# Patient Record
Sex: MALE | Race: WHITE | Employment: OTHER | URBAN - NONMETROPOLITAN AREA
[De-identification: names, ages, dates, MRNs, and addresses within clinical notes are randomized per-mention and may not be internally consistent; named-entity substitution may affect disease eponyms.]

---

## 2017-02-16 ENCOUNTER — OFFICE VISIT (OUTPATIENT)
Dept: FAMILY MEDICINE CLINIC | Age: 42
End: 2017-02-16

## 2017-02-16 VITALS
DIASTOLIC BLOOD PRESSURE: 70 MMHG | BODY MASS INDEX: 36.68 KG/M2 | HEIGHT: 71 IN | WEIGHT: 262 LBS | HEART RATE: 72 BPM | SYSTOLIC BLOOD PRESSURE: 118 MMHG

## 2017-02-16 DIAGNOSIS — F41.9 ANXIETY: Primary | ICD-10-CM

## 2017-02-16 DIAGNOSIS — E78.1 HYPERTRIGLYCERIDEMIA: ICD-10-CM

## 2017-02-16 DIAGNOSIS — R73.01 IMPAIRED FASTING BLOOD SUGAR: ICD-10-CM

## 2017-02-16 PROCEDURE — 99214 OFFICE O/P EST MOD 30 MIN: CPT | Performed by: FAMILY MEDICINE

## 2017-02-16 RX ORDER — ATORVASTATIN CALCIUM 20 MG/1
20 TABLET, FILM COATED ORAL DAILY
Qty: 30 TABLET | Refills: 11 | Status: SHIPPED | OUTPATIENT
Start: 2017-02-16 | End: 2018-03-19 | Stop reason: SDUPTHER

## 2017-02-16 ASSESSMENT — ENCOUNTER SYMPTOMS
RESPIRATORY NEGATIVE: 1
ALLERGIC/IMMUNOLOGIC NEGATIVE: 1
EYES NEGATIVE: 1
GASTROINTESTINAL NEGATIVE: 1

## 2017-08-18 RX ORDER — CITALOPRAM 20 MG/1
20 TABLET ORAL DAILY
Qty: 90 TABLET | Refills: 1 | Status: SHIPPED | OUTPATIENT
Start: 2017-08-18 | End: 2018-02-19 | Stop reason: SDUPTHER

## 2017-08-18 RX ORDER — GEMFIBROZIL 600 MG/1
600 TABLET, FILM COATED ORAL 2 TIMES DAILY
Qty: 180 TABLET | Refills: 1 | Status: SHIPPED | OUTPATIENT
Start: 2017-08-18 | End: 2018-03-19 | Stop reason: SDUPTHER

## 2018-02-19 RX ORDER — CITALOPRAM 20 MG/1
TABLET ORAL
Qty: 90 TABLET | Refills: 1 | Status: SHIPPED | OUTPATIENT
Start: 2018-02-19

## 2018-03-19 RX ORDER — GEMFIBROZIL 600 MG/1
TABLET, FILM COATED ORAL
Qty: 180 TABLET | Refills: 3 | Status: SHIPPED | OUTPATIENT
Start: 2018-03-19

## 2018-03-19 RX ORDER — ATORVASTATIN CALCIUM 20 MG/1
TABLET, FILM COATED ORAL
Qty: 30 TABLET | Refills: 11 | Status: SHIPPED | OUTPATIENT
Start: 2018-03-19

## 2019-01-08 ENCOUNTER — HOSPITAL ENCOUNTER (OUTPATIENT)
Dept: GENERAL RADIOLOGY | Age: 44
Discharge: HOME | End: 2019-01-10
Payer: COMMERCIAL

## 2019-01-08 DIAGNOSIS — M25.511 RIGHT SHOULDER PAIN, UNSPECIFIED CHRONICITY: ICD-10-CM

## 2019-01-08 PROCEDURE — 73030 X-RAY EXAM OF SHOULDER: CPT

## 2021-05-25 ENCOUNTER — HOSPITAL ENCOUNTER (OUTPATIENT)
Dept: GENERAL RADIOLOGY | Age: 46
Discharge: HOME | End: 2021-05-25
Payer: COMMERCIAL

## 2021-05-25 ENCOUNTER — HOSPITAL ENCOUNTER (OUTPATIENT)
Age: 46
Discharge: HOME | End: 2021-05-25
Payer: COMMERCIAL

## 2021-05-25 DIAGNOSIS — M46.1 BILATERAL SACROILIITIS (HCC): ICD-10-CM

## 2021-05-25 PROCEDURE — 72100 X-RAY EXAM L-S SPINE 2/3 VWS: CPT

## 2021-06-10 ENCOUNTER — HOSPITAL ENCOUNTER (OUTPATIENT)
Dept: PHYSICAL THERAPY | Age: 46
Setting detail: THERAPIES SERIES
Discharge: HOME | End: 2021-06-10
Payer: COMMERCIAL

## 2021-06-10 PROCEDURE — 97161 PT EVAL LOW COMPLEX 20 MIN: CPT | Performed by: PHYSICAL THERAPIST

## 2021-06-10 PROCEDURE — 97110 THERAPEUTIC EXERCISES: CPT | Performed by: PHYSICAL THERAPIST

## 2021-06-10 ASSESSMENT — PAIN DESCRIPTION - PROGRESSION: CLINICAL_PROGRESSION: GRADUALLY WORSENING

## 2021-06-10 ASSESSMENT — PAIN DESCRIPTION - PAIN TYPE: TYPE: ACUTE PAIN

## 2021-06-10 ASSESSMENT — PAIN DESCRIPTION - ORIENTATION: ORIENTATION: RIGHT

## 2021-06-10 ASSESSMENT — PAIN SCALES - GENERAL: PAINLEVEL_OUTOF10: 10

## 2021-06-10 ASSESSMENT — PAIN DESCRIPTION - FREQUENCY: FREQUENCY: CONTINUOUS

## 2021-06-10 ASSESSMENT — PAIN DESCRIPTION - ONSET: ONSET: PROGRESSIVE

## 2021-06-10 ASSESSMENT — PAIN - FUNCTIONAL ASSESSMENT: PAIN_FUNCTIONAL_ASSESSMENT: PREVENTS OR INTERFERES WITH MANY ACTIVE NOT PASSIVE ACTIVITIES

## 2021-06-10 ASSESSMENT — PAIN DESCRIPTION - LOCATION: LOCATION: BACK;BUTTOCKS;HIP;LEG

## 2021-06-10 NOTE — PROGRESS NOTES
Physical Therapy  Initial Assessment  Date: 6/10/2021  Patient Name: Ines Carter  MRN: [id number]  : 1975     Treatment Diagnosis: LBP and R sciatica    Restrictions- none       Subjective   General  Chart Reviewed: Yes  Patient assessed for rehabilitation services?: Yes  Response To Previous Treatment: Not applicable  Family / Caregiver Present: No  Referring Practitioner: [place] DAVILA  Referral Date : 21  Diagnosis: M54.41 lumbago and sciatica  Follows Commands: Within Functional Limits  PT Visit Information  Onset Date: 21  PT Insurance Information: [employer]  Subjective  Subjective: LBP and R sciatica started 6 weeks ago. progressive onset. Has had it before, but this is worse and won't go away. Pain Screening  Patient Currently in Pain: Yes  Pain Assessment  Pain Assessment: 0-10  Pain Level: 10  Patient's Stated Pain Goal: 2  Pain Type: Acute pain  Pain Location: Back;Buttocks;Hip;Leg  Pain Orientation: Right  Pain Radiating Towards: From back to R foot. Pain Descriptors: Aching; Shooting;Radiating; Heaviness;Pins and needles  Pain Frequency: Continuous  Pain Onset: Progressive  Clinical Progression: Gradually worsening  Functional Pain Assessment: Prevents or interferes with many active not passive activities  Vital Signs  Patient Currently in Pain: Yes    Vision/Hearing  Vision  Vision: Within Functional Limits  Hearing  Hearing: Within functional limits    Orientation  Orientation  Overall Orientation Status: Within Normal Limits    Social/Functional History  Social/Functional History  Type of Home: House  Home Layout: Two level  ADL Assistance: Independent  Homemaking Assistance: Independent  Ambulation Assistance: Independent  Transfer Assistance: Independent  Active : Yes  Mode of Transportation: Car  Occupation: Full time employment  Type of occupation: Law enforcement training.   IADL Comments: Pain with sitting, bending    Objective     Observation/Palpation  Posture: Good  Observation: No lateral shift  Body Mechanics: Limps favoring R LE    Spine  Lumbar: Flexion major loss to mid thigh. Extension mod loss of 20%. R side glide mod loss, L side glide major loss. Special Tests: FIS, RFIS increase, worse, periph. EIS, AZAM decrease, better. BULMARO, RFIL increase, worse, periph. EIL, REIL decrease, better, centralized. R SGIL decrease, better, centralized. Joint Mobility  Spine: lumbar post derangement, unilat, assymmetrical past the knee    Strength RLE  Strength RLE: WNL  Comment: no myotome pattern weakness     Additional Measures  Special Tests: R Slump + pain to the foot.   Other: R SLR + pain to the calf (+ pain cough/sneeze)        Assessment   Conditions Requiring Skilled Therapeutic Intervention  Body structures, Functions, Activity limitations: Increased pain;Decreased ROM  Treatment Diagnosis: LBP and R sciatica  Prognosis: Good  Decision Making: Low Complexity  REQUIRES PT FOLLOW UP: Yes  Activity Tolerance  Activity Tolerance: Patient Tolerated treatment well         Plan   Plan  Times per week: 2  Plan weeks: 4  Current Treatment Recommendations: Strengthening, ROM, Home Exercise Program, Manual Therapy - Joint Manipulation, Modalities    OutComes Score   Oswestry LBP Scale 30/50 = 60% disability                         Goals  Short term goals  Time Frame for Short term goals: 1 week  Short term goal 1: Start HEP  Long term goals  Time Frame for Long term goals : 4-6 weeks  Long term goal 1: LBP and sciatica controlled 2/10  Long term goal 2: R sciatica resolve 90%  Long term goal 3: Able to walk 45 min with no limp  Long term goal 4: Able to tolerate sitting & driving 60 min       Therapy Time   Individual Concurrent Group Co-treatment   Time In 0800         Time Out 0844         Minutes [address], PT

## 2021-06-10 NOTE — FLOWSHEET NOTE
Physical Therapy Daily Treatment Note    Date:  6/10/2021    Patient Name:  Malinda Shelton    :  1975  MRN: [id number]  Restrictions/Precautions:     Medical/Treatment Diagnosis Information:   · Diagnosis: M54.41 lumbago and sciatica  · Treatment Diagnosis: LBP and R sciatica  Insurance/Certification information:  PT Insurance Information: [employer]  Physician Information:  Referring Practitioner: Ilana DAVILA  Plan of care signed (Y/N):  n  Visit# / total visits: 1 /10  Pain level: 10       Time In:8:00   Time Out:8:44    Progress Note: [x]  Yes  []  No  Next due by: Visit #10 , or 21     Subjective:   See eval    Objective: See eval  Observation:   Test measurements:      Exercises: there ex for lumbar ROM, reduction of post derangement  Exercise/Equipment Resistance/Repetitions Other comments   Lay prone, R SG 3' x2    Prone on elbows 3' x2    Press up 10x x2    B PKF 10x          Modified extension 10x     Back bend 10x         Lumbar roll 2'          TM retro          Sciatic N floss          [x] Provided verbal/tactile cueing for activities related to strengthening, flexibility, endurance, ROM. (67389)  [] Provided verbal/tactile cueing for activities related to improving balance, coordination, kinesthetic sense, posture, motor skill, proprioception. (38444)    Therapeutic Activities:     [] Therapeutic activities, direct (one-on-one) patient contact (use of dynamic activities to improve functional performance). (71588)    Gait:   [] Provided training and instruction to the patient for ambulation re-education. (80540)    Self-Care/ADL's  [] Self-care/home management training and compensatory training, meal preparation, safety procedures, and instructions in use of assistive technology devices/adaptive equipment, direct one-on-one contact.  (53547)    Home Exercise Program: Lumbar extension progression for post derangement    [x] Reviewed/Progressed HEP activities related to strengthening, flexibility, endurance, ROM. (85285)  [] Reviewed/Progressed HEP activities related to improving balance, coordination, kinesthetic sense, posture, motor skill, proprioception.  (21644)    Manual Treatments:    [] Provided manual therapy to mobilize soft tissue/joints for the purpose of modulating pain, promoting relaxation,  increasing ROM, reducing/eliminating soft tissue swelling/inflammation/restriction, improving soft tissue extensibility. (12508)    Service Based Modalities:      Timed Code Treatment Minutes:   There ex/HEP 15'     Total Treatment Minutes:  15'     Treatment/Activity Tolerance:  [x] Patient tolerated treatment well [] Patient limited by fatique  [] Patient limited by pain  [] Patient limited by other medical complications  [] Other:     Prognosis: [x] Good [] Fair  [] Poor    Patient Requires Follow-up: [x] Yes  [] No      Goals:  Short term goals  Time Frame for Short term goals: 1 week  Short term goal 1: Start HEP    Long term goals  Time Frame for Long term goals : 4-6 weeks  Long term goal 1: LBP and sciatica controlled 2/10  Long term goal 2: R sciatica resolve 90%  Long term goal 3: Able to walk 45 min with no limp  Long term goal 4: Able to tolerate sitting & driving 60 min          Plan:   [] Continue per plan of care [] Alter current plan (see comments)  [x] Plan of care initiated [] Hold pending MD visit [] Discharge  Plan for Next Session:      Electronically signed by:  Jason March PT,PT

## 2021-06-10 NOTE — PLAN OF CARE
[facility]    [x] [place]  Phone: [phone]  Fax: [phone]      [] [place]  Phone: [phone]  Fax: [phone]        To: Referring Practitioner: Felicity DAVILA      Patient: Michael Fuentes  : 1975   MRN: [id number]  Evaluation Date: 6/10/2021      Diagnosis Information:  · Diagnosis: M54.41 lumbago and sciatica   · Treatment Diagnosis: LBP and R sciatica     Physical Therapy Certification Form  Dear Felicity DAVILA  The following patient has been evaluated for physical therapy services and for therapy to continue, Medicare requires monthly physician review of the treatment plan. Please review the attached evaluation and/or summary of the patient's plan of care, and verify that you agree therapy should continue by signing the attached document and sending it back to our office.     Plan of Care/Treatment to date:  [x] Therapeutic Exercise    [] Modalities:  [] Therapeutic Activity     [] Ultrasound  [x] Electrical Stimulation  [] Gait Training      [] Cervical Traction [] Lumbar Traction  [] Neuromuscular Re-education    [] Cold/hotpack [] Iontophoresis   [x] Instruction in HEP     Other:  [x] Manual Therapy      []             [] Aquatic Therapy      []                 Goals:  Short term goals  Time Frame for Short term goals: 1 week  Short term goal 1: Start HEP    Long term goals  Time Frame for Long term goals : 4-6 weeks  Long term goal 1: LBP and sciatica controlled 2/10  Long term goal 2: R sciatica resolve 90%  Long term goal 3: Able to walk 45 min with no limp  Long term goal 4: Able to tolerate sitting & driving 60 min    DAYO/ADRIANAKEIQLV: - 21  # Days per week: [] 1 day # Weeks: [] 1 week [] 5 weeks     [x] 2 days   [] 2 weeks [] 6 weeks     [] 3 days   [] 3 weeks [] 7 weeks     [] 4 days   [x] 4 weeks [] 8 weeks    Rehab Potential: [] Excellent [x] Good [] Fair  [] Poor     Electronically signed by:  Kassandra Dillon PT      If you have any questions or concerns, please don't hesitate to call.   Thank you for your referral.      Physician Signature:________________________________Date:__________________  By signing above, therapists plan is approved by physician

## 2021-06-14 ENCOUNTER — HOSPITAL ENCOUNTER (OUTPATIENT)
Dept: PHYSICAL THERAPY | Age: 46
Setting detail: THERAPIES SERIES
Discharge: HOME | End: 2021-06-14
Payer: COMMERCIAL

## 2021-06-14 PROCEDURE — G0283 ELEC STIM OTHER THAN WOUND: HCPCS | Performed by: PHYSICAL THERAPY ASSISTANT

## 2021-06-14 PROCEDURE — 97110 THERAPEUTIC EXERCISES: CPT | Performed by: PHYSICAL THERAPY ASSISTANT

## 2021-06-14 NOTE — PROGRESS NOTES
I have reviewed and agree to the content of the note written by the PTA.   Electronically signed by Karolina Boogie PT [id number]

## 2021-06-14 NOTE — FLOWSHEET NOTE
Physical Therapy Daily Treatment Note    Date:  2021    Patient Name:  Adelaide Russ    :  1975  MRN: [id number]  Restrictions/Precautions:     Medical/Treatment Diagnosis Information:   · Diagnosis: M54.41 lumbago and sciatica  · Treatment Diagnosis: LBP and R sciatica  Insurance/Certification information:  PT Insurance Information: [employer]  Physician Information:  Referring Practitioner: Theo DAVILA  Plan of care signed (Y/N):  n  Visit# / total visits:  2/10  Pain level: 8-9/10       Time In:0602  Time Out:  702    Progress Note: []  Yes  [x]  No  Next due by: Visit #10 , or 21     Subjective:  Pt. Relates feeling poor this morning, pain rated 8-9/10. Pt. Relates receiving a shot or Toradol over weekend to help with pain, also placed on steroid pack. Objective:  SILVINA complete per flow chart to facilitate strength, motion and stability to allow ease with daily activities and work duties. Initiated and advanced several exercises to improve motion and decrease pain and discomfort. Discussed use of lumbar roll and encouraged avoidance of flexion based exercises and activities. Initiated IFC to decrease pain and discomfort. Observation: Able to reduce pain from 9/10 to 6/10. Able to decrease radicular symptoms.      Test measurements:      Exercises:   Exercise/Equipment Resistance/Repetitions Other comments   Lay prone, R SG 5'    Prone on elbows 5'    Press up 10x x2    B PKF 10x 2    Prone hip ext 10x  Initiated                  Modified extension 10x     Back bend 10x    Hip abd/ext 10x Initiated   Lumbar roll           TM retro 3' Initiated        Sciatic N floss 2'         [x] Provided verbal/tactile cueing for activities related to strengthening, flexibility, endurance, ROM. (94357)  [] Provided verbal/tactile cueing for activities related to improving balance, coordination, kinesthetic sense, posture, motor skill, proprioception. (98701)    Therapeutic Activities:     [] Therapeutic activities, direct (one-on-one) patient contact (use of dynamic activities to improve functional performance). (29258)    Gait:   [] Provided training and instruction to the patient for ambulation re-education. (88315)    Self-Care/ADL's  [] Self-care/home management training and compensatory training, meal preparation, safety procedures, and instructions in use of assistive technology devices/adaptive equipment, direct one-on-one contact. (68083)    Home Exercise Program: Lumbar extension progression for post derangement    [x] Reviewed/Progressed HEP activities related to strengthening, flexibility, endurance, ROM. (01969)  [] Reviewed/Progressed HEP activities related to improving balance, coordination, kinesthetic sense, posture, motor skill, proprioception.  (34378)    Manual Treatments:    [] Provided manual therapy to mobilize soft tissue/joints for the purpose of modulating pain, promoting relaxation,  increasing ROM, reducing/eliminating soft tissue swelling/inflammation/restriction, improving soft tissue extensibility.  (47977)    Service Based Modalities:  15' IFC    Timed Code Treatment Minutes:  39' SILVINA     Total Treatment Minutes:  61'    Treatment/Activity Tolerance:  [x] Patient tolerated treatment well [] Patient limited by fatique  [] Patient limited by pain  [] Patient limited by other medical complications  [] Other:     Prognosis: [x] Good [] Fair  [] Poor    Patient Requires Follow-up: [x] Yes  [] No      Goals:  Short term goals  Time Frame for Short term goals: 1 week  Short term goal 1: Start HEP (initiated 6/10/21)    Long term goals  Time Frame for Long term goals : 4-6 weeks  Long term goal 1: LBP and sciatica controlled 2/10  Long term goal 2: R sciatica resolve 90%  Long term goal 3: Able to walk 45 min with no limp  Long term goal 4: Able to tolerate sitting & driving 60 min      Plan:   [x] Continue per plan of care [] Alter current plan (see comments)  [] Plan of care initiated [] Hold pending MD visit [] Discharge    Plan for Next Session:  Monitor tolerance to exercises and advance as able. Electronically signed by:   Ronen Killian PTA,

## 2021-06-15 ENCOUNTER — HOSPITAL ENCOUNTER (EMERGENCY)
Age: 46
Discharge: HOME | End: 2021-06-15
Attending: EMERGENCY MEDICINE
Payer: COMMERCIAL

## 2021-06-15 VITALS
HEART RATE: 76 BPM | WEIGHT: 260 LBS | OXYGEN SATURATION: 98 % | TEMPERATURE: 96.7 F | SYSTOLIC BLOOD PRESSURE: 137 MMHG | HEIGHT: 70 IN | BODY MASS INDEX: 37.22 KG/M2 | RESPIRATION RATE: 16 BRPM | DIASTOLIC BLOOD PRESSURE: 98 MMHG

## 2021-06-15 DIAGNOSIS — M54.31 SCIATICA OF RIGHT SIDE: Primary | ICD-10-CM

## 2021-06-15 PROCEDURE — 6360000002 HC RX W HCPCS: Performed by: EMERGENCY MEDICINE

## 2021-06-15 PROCEDURE — 99285 EMERGENCY DEPT VISIT HI MDM: CPT

## 2021-06-15 PROCEDURE — 96372 THER/PROPH/DIAG INJ SC/IM: CPT

## 2021-06-15 RX ORDER — GABAPENTIN 300 MG/1
CAPSULE ORAL
COMMUNITY
Start: 2021-06-11

## 2021-06-15 RX ORDER — KETOROLAC TROMETHAMINE 30 MG/ML
30 INJECTION, SOLUTION INTRAMUSCULAR; INTRAVENOUS ONCE
Status: COMPLETED | OUTPATIENT
Start: 2021-06-15 | End: 2021-06-15

## 2021-06-15 RX ADMIN — KETOROLAC TROMETHAMINE 30 MG: 30 INJECTION, SOLUTION INTRAMUSCULAR; INTRAVENOUS at 20:06

## 2021-06-15 RX ADMIN — HYDROMORPHONE HYDROCHLORIDE 1 MG: 1 INJECTION, SOLUTION INTRAMUSCULAR; INTRAVENOUS; SUBCUTANEOUS at 20:33

## 2021-06-15 ASSESSMENT — PAIN SCALES - GENERAL
PAINLEVEL_OUTOF10: 10
PAINLEVEL_OUTOF10: 10

## 2021-06-15 ASSESSMENT — PAIN DESCRIPTION - PAIN TYPE
TYPE: CHRONIC PAIN
TYPE: CHRONIC PAIN

## 2021-06-15 ASSESSMENT — PAIN DESCRIPTION - LOCATION
LOCATION: BACK
LOCATION: BACK

## 2021-06-15 ASSESSMENT — PAIN DESCRIPTION - ORIENTATION
ORIENTATION: LOWER
ORIENTATION: LOWER

## 2021-06-15 ASSESSMENT — PAIN DESCRIPTION - FREQUENCY: FREQUENCY: CONTINUOUS

## 2021-06-15 ASSESSMENT — PAIN DESCRIPTION - DESCRIPTORS: DESCRIPTORS: SPASM

## 2021-06-16 ENCOUNTER — HOSPITAL ENCOUNTER (OUTPATIENT)
Dept: MRI IMAGING | Age: 46
Discharge: HOME | End: 2021-06-18
Payer: COMMERCIAL

## 2021-06-16 ENCOUNTER — HOSPITAL ENCOUNTER (OUTPATIENT)
Dept: PHYSICAL THERAPY | Age: 46
Setting detail: THERAPIES SERIES
Discharge: HOME | End: 2021-06-16
Payer: COMMERCIAL

## 2021-06-16 DIAGNOSIS — M54.41 LOW BACK PAIN WITH RIGHT-SIDED SCIATICA, UNSPECIFIED BACK PAIN LATERALITY, UNSPECIFIED CHRONICITY: ICD-10-CM

## 2021-06-16 PROCEDURE — 72148 MRI LUMBAR SPINE W/O DYE: CPT

## 2021-06-16 NOTE — PROGRESS NOTES
Outpatient Physical Therapy    [] [place]  Phone: [phone]  Fax: [phone]      [] [place]  Phone: [phone]  Fax: [phone]    Physical Therapy  Cancellation/No-show Note  Patient Name:  Chase Perrin  :  1975   Date:  2021  Cancelled visits to date: 1  No-shows to date: 0    For today's appointment patient:  [x]  Cancelled  []  Rescheduled appointment  []  No-show     Reason given by patient:  []  Patient ill  []  Conflicting appointment  []  No transportation    []  Conflict with work  []  No reason given  [x]  Other:     Comments: Was in ER previous night      Electronically signed by:  Ronen Killian PTA

## 2021-06-16 NOTE — ED PROVIDER NOTES
eMERGENCY dEPARTMENT eNCOUnter      Pt Name: Maico Diaz  MRN: [id number]  [place] 1975  Date of evaluation: 6/15/2021      CHIEF COMPLAINT       Chief Complaint   Patient presents with    Back Pain     lower         HISTORY OF PRESENT ILLNESS    Maico Diaz is a 51 y.o. male who presents with back pain. Patient states he has been having this back pain for some time now he has been through physical therapy and is scheduled to see his primary again they have tried steroids which he just finished yesterday states he got worse today when he was lifting up a tire he denies any bowel or bladder dysfunction. REVIEW OF SYSTEMS       Review of systems are all reviewed and negative except stated above in HPI    [address]    has a past medical history of Anxiety, Appendicitis, Hypertriglyceridemia, and Simple myopia. SURGICAL HISTORY      has a past surgical history that includes laparoscopic appendectomy (2011); Vasectomy (Bilateral, 2007); Tonsillectomy (); and Appendectomy. CURRENT MEDICATIONS       Discharge Medication List as of 6/15/2021  8:52 PM      CONTINUE these medications which have NOT CHANGED    Details   gabapentin (NEURONTIN) 300 MG capsule Historical Med      gemfibrozil (LOPID) 600 MG tablet take 1 tablet by mouth twice a day, Disp-180 tablet, R-3Normal      atorvastatin (LIPITOR) 20 MG tablet take 1 tablet by mouth once daily, Disp-30 tablet, R-11Normal      citalopram (CELEXA) 20 MG tablet take 1 tablet by mouth once daily, Disp-90 tablet, R-1Normal             ALLERGIES     is allergic to aspirin and penicillins. FAMILY HISTORY     He indicated that his mother is alive. He indicated that his father is alive. He indicated that his brother is alive. He indicated that his maternal grandmother is . He indicated that his maternal grandfather is . He indicated that his paternal grandmother is .  He indicated that his paternal grandfather is . He indicated that his daughter is alive. He indicated that his son is alive. family history includes Hypertension in his father and mother. SOCIAL HISTORY      reports that he has quit smoking. His smoking use included cigarettes. He has a 5.00 pack-year smoking history. He has never used smokeless tobacco. He reports current alcohol use. He reports that he does not use drugs. PHYSICAL EXAM     INITIAL VITALS:  height is 5' 10\" (1.778 m) and weight is 260 lb (117.9 kg). His tympanic temperature is 96.7 °F (35.9 °C). His blood pressure is 137/98 (abnormal) and his pulse is 76. His respiration is 16 and oxygen saturation is 98%.       General: Patient alert nontoxic-appearing male who appears uncomfortable secondary to pain  HEENT: Head is atraumatic  Neck: Supple  Respiratory: Lung sounds are clear bilateral  Cardiac: Heart is regular rate and rhythm  GI: Abdomen soft nontender bowel sounds active throughout  Back: No CVA tenderness she has reproducible pain over the right SI joint    DIFFERENTIAL DIAGNOSIS/ MDM:     Sciatica herniated disc    DIAGNOSTIC RESULTS     EKG: All EKG's are interpreted by the Emergency Department Physician who either signs or Co-signs this chart in the absence of a cardiologist.        RADIOLOGY:   I directly visualized the following  images and reviewed the radiologist interpretations:  No orders to display         LABS:  Labs Reviewed - No data to display      EMERGENCY DEPARTMENT COURSE:   Vitals:    Vitals:    06/15/21 1950 06/15/21 1954 06/15/21 2056   BP: (!) 148/91  (!) 137/98   Pulse: 88  76   Resp: 16  16   Temp:  96.7 °F (35.9 °C)    TempSrc:  Tympanic    SpO2: 97%  98%   Weight: 260 lb (117.9 kg)     Height: 5' 10\" (1.778 m)       -------------------------  BP: (!) 137/98, Temp: 96.7 °F (35.9 °C), Pulse: 76, Resp: 16    Orders Placed This Encounter   Medications    ketorolac (TORADOL) injection 30 mg    HYDROmorphone (DILAUDID) injection 1 mg Re-evaluation Notes    Patient has no focal neurological deficits he has been evaluated and diagnosed with sciatica in the past I attempted to give him pain chest none of which is functioning at this time I do feel he needs follow-up with MRI return if worse    CRITICAL CARE:   None      CONSULTS:      PROCEDURES:  None    FINAL IMPRESSION      1. Sciatica of right side          DISPOSITION/PLAN   DISPOSITION Decision To Discharge 06/15/2021 08:51:54 PM      Condition on Disposition    Stable    PATIENT REFERRED TO:  GIOVANNI Marie  [phone]    In 1 day        DISCHARGE MEDICATIONS:  Discharge Medication List as of 6/15/2021  8:52 PM          (Please note that portions of this note were completed with a voice recognition program.  Efforts were made to edit the dictations but occasionally words are mis-transcribed.)    Saray Cohen MD,, MD, F.A.C.E.P.   Attending Emergency Physician        Saray Cohen MD  06/16/21 8140

## 2021-07-02 ENCOUNTER — APPOINTMENT (OUTPATIENT)
Dept: INTERVENTIONAL RADIOLOGY/VASCULAR | Age: 46
End: 2021-07-02
Payer: COMMERCIAL

## 2021-07-02 ENCOUNTER — HOSPITAL ENCOUNTER (EMERGENCY)
Age: 46
Discharge: HOME | End: 2021-07-02
Attending: EMERGENCY MEDICINE
Payer: COMMERCIAL

## 2021-07-02 VITALS
RESPIRATION RATE: 16 BRPM | TEMPERATURE: 96.7 F | BODY MASS INDEX: 36.4 KG/M2 | DIASTOLIC BLOOD PRESSURE: 77 MMHG | HEART RATE: 90 BPM | HEIGHT: 71 IN | SYSTOLIC BLOOD PRESSURE: 138 MMHG | WEIGHT: 260 LBS | OXYGEN SATURATION: 98 %

## 2021-07-02 DIAGNOSIS — G89.18 POSTOPERATIVE PAIN AFTER SPINAL SURGERY: ICD-10-CM

## 2021-07-02 DIAGNOSIS — M54.31 SCIATICA OF RIGHT SIDE: Primary | ICD-10-CM

## 2021-07-02 PROCEDURE — 96372 THER/PROPH/DIAG INJ SC/IM: CPT

## 2021-07-02 PROCEDURE — 99284 EMERGENCY DEPT VISIT MOD MDM: CPT

## 2021-07-02 PROCEDURE — 6360000002 HC RX W HCPCS: Performed by: EMERGENCY MEDICINE

## 2021-07-02 PROCEDURE — 93971 EXTREMITY STUDY: CPT

## 2021-07-02 RX ORDER — OXYCODONE AND ACETAMINOPHEN 10; 325 MG/1; MG/1
1 TABLET ORAL EVERY 4 HOURS PRN
COMMUNITY

## 2021-07-02 RX ORDER — FAMOTIDINE 10 MG
10 TABLET ORAL 2 TIMES DAILY
COMMUNITY

## 2021-07-02 RX ORDER — POLYETHYLENE GLYCOL 3350 17 G/17G
17 POWDER, FOR SOLUTION ORAL DAILY
COMMUNITY

## 2021-07-02 RX ORDER — KETOROLAC TROMETHAMINE 30 MG/ML
60 INJECTION, SOLUTION INTRAMUSCULAR; INTRAVENOUS ONCE
Status: COMPLETED | OUTPATIENT
Start: 2021-07-02 | End: 2021-07-02

## 2021-07-02 RX ORDER — DEXAMETHASONE SODIUM PHOSPHATE 10 MG/ML
10 INJECTION INTRAMUSCULAR; INTRAVENOUS ONCE
Status: COMPLETED | OUTPATIENT
Start: 2021-07-02 | End: 2021-07-02

## 2021-07-02 RX ORDER — AMLODIPINE BESYLATE 5 MG/1
5 TABLET ORAL DAILY
COMMUNITY

## 2021-07-02 RX ADMIN — DEXAMETHASONE SODIUM PHOSPHATE 10 MG: 10 INJECTION INTRAMUSCULAR; INTRAVENOUS at 11:39

## 2021-07-02 RX ADMIN — KETOROLAC TROMETHAMINE 60 MG: 30 INJECTION, SOLUTION INTRAMUSCULAR at 11:39

## 2021-07-02 ASSESSMENT — ENCOUNTER SYMPTOMS
BLOOD IN STOOL: 0
CONSTIPATION: 1
ABDOMINAL PAIN: 0
DIARRHEA: 0
BACK PAIN: 1
SORE THROAT: 0
WHEEZING: 0
NAUSEA: 0
VOMITING: 0
SHORTNESS OF BREATH: 0
TROUBLE SWALLOWING: 0

## 2021-07-02 ASSESSMENT — PAIN SCALES - GENERAL
PAINLEVEL_OUTOF10: 9
PAINLEVEL_OUTOF10: 10

## 2021-07-02 ASSESSMENT — PAIN DESCRIPTION - DIRECTION: RADIATING_TOWARDS: LEG

## 2021-07-02 ASSESSMENT — PAIN DESCRIPTION - LOCATION: LOCATION: BACK

## 2021-07-02 ASSESSMENT — PAIN DESCRIPTION - ORIENTATION: ORIENTATION: RIGHT

## 2021-07-02 ASSESSMENT — PAIN DESCRIPTION - PAIN TYPE: TYPE: ACUTE PAIN

## 2021-07-02 NOTE — ED PROVIDER NOTES
[address] [facility]  eMERGENCY dEPARTMENT eNCOUnter      Pt Name: Lisandra Le  MRN: [id number]  [place] 1975  Date of evaluation: 7/2/2021      CHIEF COMPLAINT       Chief Complaint   Patient presents with    Leg Pain     increasing right calf pain, pt relates to his sciatica pain, recent surgery         HISTORY OF PRESENT ILLNESS    Lisandra Le is a 51 y.o. male who presents with complaints of increasing right leg pain patient had a lumbar surgery last week and complications so he had a fusion done on Monday at [facility] he is currently on 10 mg Percocets and gabapentin the pain is getting worse it radiates to the same distribution as his previous sciatica through the right buttock down to the right lateral calf he contacted his surgeon today recommend he come in and make sure he is does not have a DVT. That he may be getting a course of steroids there is been no fevers no chills this is the same pain he had before no new weakness but any kind of movement causes severe pain patient says the incision seems to be doing well  Is been no bowel or bladder incontinence, patient states that he is taking Colace and MiraLAX    REVIEW OF SYSTEMS         Review of Systems   Constitutional: Negative for chills and fever. HENT: Negative for congestion, dental problem, sore throat and trouble swallowing. Eyes: Negative for visual disturbance. Respiratory: Negative for shortness of breath and wheezing. Cardiovascular: Negative for chest pain, palpitations and leg swelling. Gastrointestinal: Positive for constipation. Negative for abdominal pain, blood in stool, diarrhea, nausea and vomiting. Genitourinary: Negative for difficulty urinating, dysuria and testicular pain. Musculoskeletal: Positive for back pain. Negative for joint swelling and neck pain. Back pain without radiation to the right leg as described   Skin: Negative for rash.    Neurological: Negative for dizziness, syncope, weakness, numbness and headaches. Hematological: Negative for adenopathy. Does not bruise/bleed easily. Psychiatric/Behavioral: Negative for confusion and suicidal ideas. PAST MEDICAL HISTORY    has a past medical history of Anxiety, Appendicitis, Hypertriglyceridemia, and Simple myopia. SURGICAL HISTORY      has a past surgical history that includes laparoscopic appendectomy (2011); Vasectomy (Bilateral, 2007); Tonsillectomy (); and Appendectomy. CURRENT MEDICATIONS       Previous Medications    AMLODIPINE (NORVASC) 5 MG TABLET    Take 5 mg by mouth daily    ATORVASTATIN (LIPITOR) 20 MG TABLET    take 1 tablet by mouth once daily    CITALOPRAM (CELEXA) 20 MG TABLET    take 1 tablet by mouth once daily    DOCUSATE SODIUM (COLACE PO)    Take 100 mg by mouth    FAMOTIDINE (PEPCID) 10 MG TABLET    Take 10 mg by mouth 2 times daily    GABAPENTIN (NEURONTIN) 300 MG CAPSULE        GEMFIBROZIL (LOPID) 600 MG TABLET    take 1 tablet by mouth twice a day    OXYCODONE-ACETAMINOPHEN (PERCOCET)  MG PER TABLET    Take 1 tablet by mouth every 4 hours as needed for Pain. POLYETHYLENE GLYCOL (GLYCOLAX) 17 GM/SCOOP POWDER    Take 17 g by mouth daily       ALLERGIES     is allergic to aspirin and penicillins. FAMILY HISTORY     He indicated that his mother is alive. He indicated that his father is alive. He indicated that his brother is alive. He indicated that his maternal grandmother is . He indicated that his maternal grandfather is . He indicated that his paternal grandmother is . He indicated that his paternal grandfather is . He indicated that his daughter is alive. He indicated that his son is alive. family history includes Hypertension in his father and mother. SOCIAL HISTORY      reports that he has quit smoking. His smoking use included cigarettes. He has a 5.00 pack-year smoking history.  He has never used smokeless tobacco. He reports current alcohol use. He reports that he does not use drugs. PHYSICAL EXAM     INITIAL VITALS:  height is 5' 11\" (1.803 m) and weight is 260 lb (117.9 kg). His blood pressure is 138/77 and his pulse is 90. His respiration is 16 and oxygen saturation is 98%. Physical Exam  Constitutional:       Appearance: Normal appearance. He is well-developed. HENT:      Head: Normocephalic and atraumatic. Eyes:      Pupils: Pupils are equal, round, and reactive to light. Cardiovascular:      Rate and Rhythm: Normal rate and regular rhythm. Pulmonary:      Effort: Pulmonary effort is normal.      Breath sounds: Normal breath sounds. Abdominal:      General: Bowel sounds are normal.      Palpations: Abdomen is soft. Musculoskeletal:         General: Normal range of motion. Cervical back: Normal range of motion and neck supple. Comments: Patient has a well-healing midline lumbar incision incision the staples are intact is no obvious erythema or drainage the right lower extremity he is very tender in the buttock and in the calf without obvious swelling or redness good distal pulses are noted sensations intact   Skin:     General: Skin is warm and dry. Neurological:      Mental Status: He is alert and oriented to person, place, and time.    Psychiatric:         Behavior: Behavior normal.           DIFFERENTIAL DIAGNOSIS/ MDM:     Sciatica, will do an ultrasound to rule out DVT I then discussed with his surgeons at [address] [place] [address]     EKG: All EKG's are interpreted by the Emergency Department Physician who either signs or Co-signs this chart in the absence of a cardiologist.        RADIOLOGY:   I directly visualized the following  images and reviewed the radiologist interpretations:       EXAMINATION:   DUPLEX VENOUS ULTRASOUND OF THE RIGHT LOWER EXTREMITY, 7/2/2021 10:15 am       TECHNIQUE:   Duplex ultrasound using B-mode/gray scaled imaging and Doppler spectral   analysis and color flow was obtained of the right lower extremity.       COMPARISON:   None.       HISTORY:   ORDERING SYSTEM PROVIDED HISTORY: Swelling, Rule Out DVT   TECHNOLOGIST PROVIDED HISTORY:   Swelling, Rule Out DVT   Reason for Exam: swelling   Acuity: Acute   Type of Exam: Initial       FINDINGS:   The visualized veins of the right lower extremity are patent and free of   echogenic thrombus. The veins demonstrate good compressibility with normal   color flow study and spectral analysis.         Impression   No evidence of DVT in the right lower extremity.               ED BEDSIDE ULTRASOUND:       LABS:  Labs Reviewed - No data to display        EMERGENCY DEPARTMENT COURSE:   Vitals:    Vitals:    07/02/21 1002   BP: 138/77   Pulse: 90   Resp: 16   SpO2: 98%   Weight: 260 lb (117.9 kg)   Height: 5' 11\" (1.803 m)     -------------------------  BP: 138/77,  , Pulse: 90, Resp: 16        Re-evaluation Notes        CRITICAL CARE:   None        CONSULTS: I did consult patient's surgeon Dr. Kelly Lima who recommended a shot of Decadron and Toradol they called in a prescription for oral steroids as well he is to continue with his Percocet and follow-up      PROCEDURES:  None    FINAL IMPRESSION      1. Sciatica of right side    2. Postoperative pain after spinal surgery          DISPOSITION/PLAN   DISPOSITION discharged    Condition on Disposition    Stable    PATIENT REFERRED TO:  Your surgeon    In 3 days        DISCHARGE MEDICATIONS:  New Prescriptions    No medications on file       (Please note that portions of this note were completed with a voice recognition program.  Efforts were made to edit the dictations but occasionally words are mis-transcribed.)    Loulou Rowe MD,, MD, F.A.A.E.M.   Attending Emergency Physician                          Loulou Rowe MD  07/02/21 [id number]

## 2021-08-04 ENCOUNTER — HOSPITAL ENCOUNTER (OUTPATIENT)
Dept: GENERAL RADIOLOGY | Age: 46
Discharge: HOME | End: 2021-08-06
Payer: COMMERCIAL

## 2021-08-04 DIAGNOSIS — Z98.890 S/P LUMBAR DISCECTOMY: ICD-10-CM

## 2021-08-04 DIAGNOSIS — Z98.1 S/P LUMBAR FUSION: ICD-10-CM

## 2021-08-04 DIAGNOSIS — M51.26 LUMBAR DISC HERNIATION: ICD-10-CM

## 2021-08-04 PROCEDURE — 72100 X-RAY EXAM L-S SPINE 2/3 VWS: CPT

## 2021-08-09 ENCOUNTER — HOSPITAL ENCOUNTER (OUTPATIENT)
Dept: PHYSICAL THERAPY | Age: 46
Setting detail: THERAPIES SERIES
Discharge: HOME | End: 2021-08-09
Payer: COMMERCIAL

## 2021-08-09 PROCEDURE — 97162 PT EVAL MOD COMPLEX 30 MIN: CPT | Performed by: PHYSICAL THERAPIST

## 2021-08-09 PROCEDURE — 97110 THERAPEUTIC EXERCISES: CPT | Performed by: PHYSICAL THERAPIST

## 2021-08-09 ASSESSMENT — PAIN DESCRIPTION - LOCATION: LOCATION: BACK

## 2021-08-09 ASSESSMENT — PAIN DESCRIPTION - ORIENTATION: ORIENTATION: RIGHT;MID;LOWER

## 2021-08-09 ASSESSMENT — PAIN DESCRIPTION - PAIN TYPE: TYPE: ACUTE PAIN;SURGICAL PAIN

## 2021-08-09 ASSESSMENT — PAIN DESCRIPTION - ONSET: ONSET: ON-GOING

## 2021-08-09 ASSESSMENT — PAIN DESCRIPTION - FREQUENCY: FREQUENCY: INTERMITTENT

## 2021-08-09 ASSESSMENT — PAIN SCALES - GENERAL: PAINLEVEL_OUTOF10: 3

## 2021-08-09 ASSESSMENT — PAIN DESCRIPTION - PROGRESSION: CLINICAL_PROGRESSION: GRADUALLY IMPROVING

## 2021-08-09 ASSESSMENT — PAIN - FUNCTIONAL ASSESSMENT: PAIN_FUNCTIONAL_ASSESSMENT: PREVENTS OR INTERFERES SOME ACTIVE ACTIVITIES AND ADLS

## 2021-08-09 NOTE — PROGRESS NOTES
Currently in Pain: Yes    Vision/Hearing       Orientation  Orientation  Overall Orientation Status: Within Normal Limits    Social/Functional History  Social/Functional History  Lives With: Spouse; Family  Type of Home: House  Home Layout: Two level  Home Access: Stairs to enter with rails  Entrance Stairs - Number of Steps: 5  Entrance Stairs - Rails: Left  Receives Help From: Family  ADL Assistance: Independent  Homemaking Assistance: Independent  Homemaking Responsibilities: Yes  Meal Prep Responsibility: Secondary  Laundry Responsibility: Secondary  Cleaning Responsibility: Secondary  Shopping Responsibility: Secondary  Ambulation Assistance: Independent  Transfer Assistance: Independent  Active : Yes  Mode of Transportation: Truck  Occupation: Full time employment  Type of occupation: law enforcement training  Leisure & Hobbies: hunting, yard work    Objective     Observation/Palpation  Posture: Fair  Palpation: increased tone/spasm in lumbar paraspinals  Observation: arrives to appointment wearing supportive back brace    PROM RLE (degrees)  RLE PROM: WNL  AROM RLE (degrees)  RLE AROM: WNL  PROM LLE (degrees)  LLE PROM: WNL  AROM LLE (degrees)  LLE AROM : WNL  Spine  Lumbar: flexion: fingertips 9\" from floor   ext: 15 degrees    Strength RLE  Strength RLE: WFL  Strength LLE  Strength LLE: WFL  Strength Other  Other: upper/lower abs: 4-/5   trans Ab: 4-/5     Additional Measures  Special Tests: + repeated flexion, - repeated extension     Ambulation  Ambulation?: Yes  Ambulation 1  Surface: level tile  Device: No Device  Assistance: Independent  Quality of Gait: increased lateral foot propulsion of R foot, with decreased balance throughout R foot during gait cycle.  Correct via a muscle energy correction of R posterior innominate rotation  Distance: 100'  Balance  Posture: Fair  Sitting - Static: Good  Sitting - Dynamic: Good  Standing - Static: Fair;+  Standing - Dynamic: Fair     Assessment   Conditions Requiring Skilled Therapeutic Intervention  Body structures, Functions, Activity limitations: Decreased functional mobility ; Decreased ADL status; Decreased strength;Decreased ROM; Decreased endurance;Decreased posture; Increased pain  Treatment Diagnosis: s/p lumbar surgery  Prognosis: Good  Decision Making: Medium Complexity  REQUIRES PT FOLLOW UP: Yes  Activity Tolerance  Activity Tolerance: Patient Tolerated treatment well     Plan   Plan  Times per week: 2  Plan weeks: 6  Current Treatment Recommendations: ROM, Strengthening, Neuromuscular Re-education, Home Exercise Program, Manual Therapy - Soft Tissue Mobilization, Modalities    Goals  Short term goals  Time Frame for Short term goals: 3 weeks  Short term goal 1: Initiate HEP  Short term goal 2: Decrease low back pain/discomfort with reaching to 0/10 for improved ease with ADL  Short term goal 3: Increase core strength to 4-/5 for improved posture and endurance  Long term goals  Time Frame for Long term goals : 6 weeks  Long term goal 1: Indep with HEP  Long term goal 2:  Increase core strength to 4+/5 for improved posture and endurance  Long term goal 3: Pt to have increased lumbar AROM to [facility] for improved ease with ADL  Long term goal 4: Pt to tolerate community ambulation without need for back brace for return to previous level of function  Long term goal 5: Oswestry score <15% disabled for return to previous level of function     Therapy Time   Individual Concurrent Group Co-treatment   Time In 1100         Time Out 1147         Minutes 47         Timed Code Treatment Minutes: Sebastian Neely, PT, DPT

## 2021-08-09 NOTE — PLAN OF CARE
[facility]    [x] [place]  Phone: [phone]  Fax: [phone]      [] [place]  Phone: [phone]  Fax: [phone]        To: Referring Practitioner: Yohan Lopez MD      Patient: Jolie Gutierrez  : 1975   MRN: [id number]  Evaluation Date: 2021      Diagnosis Information:  · Diagnosis: recurrent right L4-L5 paracentral disc herniation; s/p lumbar surgery   · Treatment Diagnosis: s/p lumbar surgery     Physical Therapy Certification Form  Dear Dr. Bj Sanches  The following patient has been evaluated for physical therapy services and for therapy to continue, insurance requires monthly physician review of the treatment plan. Please review the attached evaluation and/or summary of the patient's plan of care, and verify that you agree therapy should continue by signing the attached document and sending it back to our office. Plan of Care/Treatment to date:  [x] Therapeutic Exercise    [x] Modalities:  [] Therapeutic Activity     [] Ultrasound  [x] Electrical Stimulation  [] Gait Training      [] Cervical Traction [] Lumbar Traction  [x] Neuromuscular Re-education    [x] Cold/hotpack [] Iontophoresis   [x] Instruction in HEP     Other:  [x] Manual Therapy      []             [] Aquatic Therapy      []           ? Goals:  Short term goals  Time Frame for Short term goals: 3 weeks  Short term goal 1: Initiate HEP  Short term goal 2: Decrease low back pain/discomfort with reaching to 0/10 for improved ease with ADL  Short term goal 3: Increase core strength to 4-/5 for improved posture and endurance    Long term goals  Time Frame for Long term goals : 6 weeks  Long term goal 1: Indep with HEP  Long term goal 2:  Increase core strength to 4+/5 for improved posture and endurance  Long term goal 3: Pt to have increased lumbar AROM to [facility] for improved ease with ADL  Long term goal 4: Pt to tolerate community ambulation without need for back brace for return to previous level of function  Long term goal 5: Oswestry score <15% disabled for return to previous level of function    Frequency/Duration: 8/9/21 - 10/20/21  # Days per week: [] 1 day # Weeks: [] 1 week [] 5 weeks     [x] 2 days? [] 2 weeks [x] 6 weeks     [] 3 days   [] 3 weeks [] 7 weeks     [] 4 days   [] 4 weeks [] 8 weeks    Rehab Potential: [] Excellent [x] Good [] Fair  [] Poor     Electronically signed by:  Miah Cai, PT, DPT    If you have any questions or concerns, please don't hesitate to call.   Thank you for your referral.      Physician Signature:________________________________Date:__________________  By signing above, therapists plan is approved by physician

## 2021-08-16 ENCOUNTER — HOSPITAL ENCOUNTER (OUTPATIENT)
Dept: PHYSICAL THERAPY | Age: 46
Setting detail: THERAPIES SERIES
Discharge: HOME | End: 2021-08-16
Payer: COMMERCIAL

## 2021-08-16 PROCEDURE — 97110 THERAPEUTIC EXERCISES: CPT | Performed by: PHYSICAL THERAPY ASSISTANT

## 2021-08-16 NOTE — FLOWSHEET NOTE
Physical Therapy Daily Treatment Note    Date:  2021    Patient Name:  Alejandrina Esteban    :  1975  MRN: [id number]  Restrictions/Precautions:     Medical/Treatment Diagnosis Information:   · Diagnosis: recurrent right L4-L5 paracentral disc herniation; s/p lumbar surgery  · Treatment Diagnosis: s/p lumbar surgery  Insurance/Certification information:  PT Insurance Information: [employer]  Physician Information:  Referring Practitioner: Fredrick Granger MD  Plan of care signed (Y/N):  N  Visit# / total visits:    Pain level: 0/10       Time In: 0703  Time Out:747  Progress Note: []  Yes  [x]  No  Next due by: Visit #12 or by 21    Subjective:  Pt. Relates pain this date rated 0/10. [place] with discomfort only. Notes understanding with current HEP. Objective: Corrected right anterior jing-pelvic rotation with muscle energy. SILVINA complete per flow chart to facilitate strength, motion and stability to allow ease with daily activities and work duties. Added and advanced several exercises to improve motion and stability. Verbal cuing for progression and technique with exercises.         Observation:   Test measurements:       Exercises:   Exercise/Equipment Resistance/Repetitions Other comments   Prone  5'    Prone knee bends 20x         LTR 5\" x 10    PPT 3\" x 15    Ab Brace 3\" x 15    Bridges 10x ea Narrow and Wide   Clams 20x ea              T-band Row/ext 15x ea Black Ab Stab   SPO 10x ea Black    Squats 10x ea 3-way (Initaited)   Ab brace with hip ext, ABD, HS curls 15x each     Ab brace with sidestep 2 laps                        [x] Provided verbal/tactile cueing for activities related to strengthening, flexibility, endurance, ROM. (91580)  [] Provided verbal/tactile cueing for activities related to improving balance, coordination, kinesthetic sense, posture, motor skill, proprioception. (45383)    Therapeutic Activities:     [] Therapeutic activities, direct (one-on-one) patient contact (use of dynamic activities to improve functional performance). (55755)    Gait:   [] Provided training and instruction to the patient for ambulation re-education. (44079)    Self-Care/ADL's  [] Self-care/home management training and compensatory training, meal preparation, safety procedures, and instructions in use of assistive technology devices/adaptive equipment, direct one-on-one contact. (32252)    Home Exercise Program:     [x] Reviewed/Progressed HEP activities related to strengthening, flexibility, endurance, ROM. (66865)  [] Reviewed/Progressed HEP activities related to improving balance, coordination, kinesthetic sense, posture, motor skill, proprioception.  (93334)    Manual Treatments:    [] Provided manual therapy to mobilize soft tissue/joints for the purpose of modulating pain, promoting relaxation,  increasing ROM, reducing/eliminating soft tissue swelling/inflammation/restriction, improving soft tissue extensibility. (80125)    Service Based Modalities:      Timed Code Treatment Minutes: 40' there-ex    Total Treatment Minutes:   40'    Treatment/Activity Tolerance:  [x] Patient tolerated treatment well [] Patient limited by fatique  [] Patient limited by pain  [] Patient limited by other medical complications  [] Other:     Prognosis: [x] Good [] Fair  [] Poor    Patient Requires Follow-up: [x] Yes  [] No      Goals:  Short term goals  Time Frame for Short term goals: 3 weeks  Short term goal 1: Initiate HEP (Initiated at [facility])  Short term goal 2: Decrease low back pain/discomfort with reaching to 0/10 for improved ease with ADL (0/10, soreness only)  Short term goal 3: Increase core strength to 4-/5 for improved posture and endurance    Long term goals  Time Frame for Long term goals : 6 weeks  Long term goal 1: Indep with HEP  Long term goal 2:  Increase core strength to 4+/5 for improved posture and endurance  Long term goal 3: Pt to have increased lumbar AROM to [facility] for improved ease with ADL  Long term goal 4: Pt to tolerate community ambulation without need for back brace for return to previous level of function  Long term goal 5: Oswestry score <15% disabled for return to previous level of function    Plan:   [x] Continue per plan of care [] Alter current plan (see comments)  [] Plan of care initiated [] Hold pending MD visit [] Discharge    Plan for Next Session: Monitor tolerance to exercises and advance as able. Electronically signed by:   Bob CompanyDENIS

## 2021-08-17 ENCOUNTER — HOSPITAL ENCOUNTER (OUTPATIENT)
Dept: PHYSICAL THERAPY | Age: 46
Setting detail: THERAPIES SERIES
Discharge: HOME | End: 2021-08-17
Payer: COMMERCIAL

## 2021-08-17 PROCEDURE — 97110 THERAPEUTIC EXERCISES: CPT

## 2021-08-17 NOTE — FLOWSHEET NOTE
Physical Therapy Daily Treatment Note    Date:  2021    Patient Name:  Naima Hurt    :  1975  MRN: [id number]  Restrictions/Precautions:     Medical/Treatment Diagnosis Information:   · Diagnosis: recurrent right L4-L5 paracentral disc herniation; s/p lumbar surgery  · Treatment Diagnosis: s/p lumbar surgery  Insurance/Certification information:  PT Insurance Information: [employer]  Physician Information:  Referring Practitioner: Christiano Cuellar MD  Plan of care signed (Y/N):  N  Visit# / total visits:  3/12  Pain level: 0/10       Time In: 8:03 am Time Out: 8:44 am  Progress Note: []  Yes  [x]  No  Next due by: Visit #12 or by 21    Subjective:  Pt. Relates pain this date rated 0/10. Notes understanding with current HEP. Objective: SILVINA complete per flow chart to facilitate strength, motion and stability to allow ease with daily activities and work duties. Added and advanced several exercises to improve motion and stability. Verbal cuing for progression and technique with exercises. Educated patient on importance of daily ambulation to improve tolerance and endurance. Observation: Noted lateral foot propulsion on right with gait.    Test measurements:  Standing dynamic balance Good minus      Exercises:   Exercise/Equipment Resistance/Repetitions Other comments   Prone  5'    Prone knee bends 20x    Quadruped hip extension X 10 reps each    Quadruped fire hyrants X 10 reps each              LTR 5\" x 10    PPT 3\" x 15    Ab Brace 3\" x 15    Bridges 10x ea Narrow and Wide   Clams 15x ea Green t band    Crunch without flexion X 10 reps                    Step hamstring stretch     T-band Row/ext 15x ea Black Ab Stab   SPO 15x ea Black    Squats 10x ea 3-way    Ab brace with hip ext, ABD, HS curls 15x each     Ab brace with sidestep 2 laps                        [x] Provided verbal/tactile cueing for activities related to strengthening, flexibility, endurance, ROM. (66059)  [] goals  Time Frame for Long term goals : 6 weeks  Long term goal 1: Indep with HEP  Long term goal 2: Increase core strength to 4+/5 for improved posture and endurance  Long term goal 3: Pt to have increased lumbar AROM to [facility] for improved ease with ADL  Long term goal 4: Pt to tolerate community ambulation without need for back brace for return to previous level of function  Long term goal 5: Oswestry score <15% disabled for return to previous level of function    Plan:   [x] Continue per plan of care [] Alter current plan (see comments)  [] Plan of care initiated [] Hold pending MD visit [] Discharge    Plan for Next Session: Monitor tolerance to exercises and advance as able.      Electronically signed by:  Bentley Jaime PTA,

## 2021-08-27 ENCOUNTER — HOSPITAL ENCOUNTER (OUTPATIENT)
Dept: PHYSICAL THERAPY | Age: 46
Setting detail: THERAPIES SERIES
Discharge: HOME | End: 2021-08-27
Payer: COMMERCIAL

## 2021-08-27 PROCEDURE — 97110 THERAPEUTIC EXERCISES: CPT

## 2021-08-27 NOTE — FLOWSHEET NOTE
brace with hip ext, ABD, HS curls 15x each     Ab brace with sidestep 2 laps    Lunges 15x alternating                  [x] Provided verbal/tactile cueing for activities related to strengthening, flexibility, endurance, ROM. (38043)  [] Provided verbal/tactile cueing for activities related to improving balance, coordination, kinesthetic sense, posture, motor skill, proprioception. (02989)    Therapeutic Activities:     [] Therapeutic activities, direct (one-on-one) patient contact (use of dynamic activities to improve functional performance). (22206)    Gait:   [] Provided training and instruction to the patient for ambulation re-education. (71741)    Self-Care/ADL's  [] Self-care/home management training and compensatory training, meal preparation, safety procedures, and instructions in use of assistive technology devices/adaptive equipment, direct one-on-one contact. (85794)    Home Exercise Program:     [] Reviewed/Progressed HEP activities related to strengthening, flexibility, endurance, ROM. (15526)  [] Reviewed/Progressed HEP activities related to improving balance, coordination, kinesthetic sense, posture, motor skill, proprioception.  (78486)    Manual Treatments:    [] Provided manual therapy to mobilize soft tissue/joints for the purpose of modulating pain, promoting relaxation,  increasing ROM, reducing/eliminating soft tissue swelling/inflammation/restriction, improving soft tissue extensibility.  (16290)    Service Based Modalities:      Timed Code Treatment Minutes: 39' there-ex    Total Treatment Minutes:   39'    Treatment/Activity Tolerance:  [x] Patient tolerated treatment well [] Patient limited by fatique  [] Patient limited by pain  [] Patient limited by other medical complications  [] Other:     Prognosis: [x] Good [] Fair  [] Poor    Patient Requires Follow-up: [x] Yes  [] No      Goals:  Short term goals  Time Frame for Short term goals: 3 weeks  Short term goal 1: Initiate HEP (Initiated at [facility])  Short term goal 2: Decrease low back pain/discomfort with reaching to 0/10 for improved ease with ADL (0/10, soreness only)  Short term goal 3: Increase core strength to 4-/5 for improved posture and endurance    Long term goals  Time Frame for Long term goals : 6 weeks  Long term goal 1: Indep with HEP  Long term goal 2: Increase core strength to 4+/5 for improved posture and endurance  Long term goal 3: Pt to have increased lumbar AROM to [facility] for improved ease with ADL  Long term goal 4: Pt to tolerate community ambulation without need for back brace for return to previous level of function  Long term goal 5: Oswestry score <15% disabled for return to previous level of function    Plan:   [x] Continue per plan of care [] Alter current plan (see comments)  [] Plan of care initiated [] Hold pending MD visit [] Discharge    Plan for Next Session: Monitor tolerance to exercises and advance as able. Patient treated by TAIWO Corey under direct, one-on-one supervision of licensed PTA.     Electronically signed by:  Pleas Bence, PTA,

## 2021-11-17 ENCOUNTER — HOSPITAL ENCOUNTER (OUTPATIENT)
Dept: GENERAL RADIOLOGY | Age: 46
Discharge: HOME | End: 2021-11-19
Payer: COMMERCIAL

## 2021-11-17 DIAGNOSIS — M96.1 POST LAMINECTOMY SYNDROME: ICD-10-CM

## 2021-11-17 PROCEDURE — 72100 X-RAY EXAM L-S SPINE 2/3 VWS: CPT

## 2024-08-09 ENCOUNTER — OFFICE VISIT (OUTPATIENT)
Dept: PRIMARY CARE CLINIC | Age: 49
End: 2024-08-09
Payer: COMMERCIAL

## 2024-08-09 VITALS
OXYGEN SATURATION: 98 % | SYSTOLIC BLOOD PRESSURE: 132 MMHG | DIASTOLIC BLOOD PRESSURE: 88 MMHG | RESPIRATION RATE: 16 BRPM | HEART RATE: 79 BPM | HEIGHT: 72 IN | TEMPERATURE: 97.4 F | BODY MASS INDEX: 34.27 KG/M2 | WEIGHT: 253 LBS

## 2024-08-09 DIAGNOSIS — L03.116 CELLULITIS OF LEFT LOWER EXTREMITY: ICD-10-CM

## 2024-08-09 DIAGNOSIS — L23.7 POISON IVY DERMATITIS: Primary | ICD-10-CM

## 2024-08-09 PROCEDURE — 99203 OFFICE O/P NEW LOW 30 MIN: CPT

## 2024-08-09 RX ORDER — CEPHALEXIN 500 MG/1
500 CAPSULE ORAL 3 TIMES DAILY
Qty: 21 CAPSULE | Refills: 0 | Status: SHIPPED | OUTPATIENT
Start: 2024-08-09 | End: 2024-08-16

## 2024-08-09 RX ORDER — PREDNISONE 20 MG/1
TABLET ORAL
Qty: 18 TABLET | Refills: 0 | Status: SHIPPED | OUTPATIENT
Start: 2024-08-09

## 2024-08-09 RX ORDER — ROSUVASTATIN CALCIUM 40 MG/1
40 TABLET, COATED ORAL EVERY EVENING
COMMUNITY

## 2024-08-09 RX ORDER — ROSUVASTATIN CALCIUM 20 MG/1
20 TABLET, COATED ORAL DAILY
COMMUNITY

## 2024-08-09 ASSESSMENT — ENCOUNTER SYMPTOMS
COLOR CHANGE: 1
SHORTNESS OF BREATH: 0
COUGH: 0

## 2024-08-09 NOTE — PROGRESS NOTES
[facility] [place] [facility] department of [facility]  [address] [facility] [address]  Phone: [phone]  Fax: [phone]      Zachary Minaya is a 49 y.o. male who presents to the [facility] today for his medical conditions/complaints as noted below. Zachary Minaya is c/o of Rash (Pt c/o what appears to be poison ivy on his arms, legs and lower abdomen x 7 days. Pt has been using calamine and rubbing alcohol to dry it out but it has continued to spread and get worse.)          HPI:     Rash  This is a new problem. The current episode started in the past 7 days (x1 week). The problem has been gradually worsening since onset. The rash is diffuse (bilateral upper and lower extremities, abdomen). The rash is characterized by redness and itchiness. He was exposed to plant contact. Pertinent negatives include no cough, fever or shortness of breath. Treatments tried: calamine, alcohol. The treatment provided mild relief. There is no history of allergies or varicella.       Past Medical History:   Diagnosis Date    Anxiety     Appendicitis 02/26/2011    Hypertriglyceridemia     Simple myopia         Allergies   Allergen Reactions    Aspirin     Dexamethasone Other (See Comments)     depression    Penicillins        Wt Readings from Last 3 Encounters:   08/09/24 114.8 kg (253 lb)   07/02/21 117.9 kg (260 lb)   06/15/21 117.9 kg (260 lb)     BP Readings from Last 3 Encounters:   08/09/24 132/88   07/02/21 138/77   06/15/21 (!) 137/98      Temp Readings from Last 3 Encounters:   08/09/24 97.4 °F (36.3 °C)   07/02/21 96.7 °F (35.9 °C) (Tympanic)   06/15/21 96.7 °F (35.9 °C) (Tympanic)     Pulse Readings from Last 3 Encounters:   08/09/24 79   07/02/21 90   06/15/21 76     SpO2 Readings from Last 3 Encounters:   08/09/24 98%   07/02/21 98%   06/15/21 98%       Subjective:      Review of Systems   Constitutional:  Negative for fever.   Respiratory:  Negative for cough and shortness of

## 2024-08-09 NOTE — PATIENT INSTRUCTIONS
Keflex x 7 days  Prednisone: take as directed  Continue calamine lotion  Return for continued or worsening symptoms

## 2025-01-16 ENCOUNTER — HOSPITAL ENCOUNTER (OUTPATIENT)
Dept: MRI IMAGING | Age: 50
Discharge: HOME | End: 2025-01-18
Payer: COMMERCIAL

## 2025-01-16 DIAGNOSIS — Z98.1 ARTHRODESIS STATUS: ICD-10-CM

## 2025-01-16 PROCEDURE — 72148 MRI LUMBAR SPINE W/O DYE: CPT

## 2025-03-13 ENCOUNTER — HOSPITAL ENCOUNTER (OUTPATIENT)
Dept: GENERAL RADIOLOGY | Age: 50
Discharge: HOME | End: 2025-03-15
Payer: COMMERCIAL

## 2025-03-13 DIAGNOSIS — M13.811 OTHER SPECIFIED ARTHRITIS, RIGHT SHOULDER: ICD-10-CM

## 2025-03-13 PROCEDURE — 73030 X-RAY EXAM OF SHOULDER: CPT
